# Patient Record
Sex: MALE | Race: WHITE
[De-identification: names, ages, dates, MRNs, and addresses within clinical notes are randomized per-mention and may not be internally consistent; named-entity substitution may affect disease eponyms.]

---

## 2022-11-13 ENCOUNTER — HOSPITAL ENCOUNTER (EMERGENCY)
Dept: HOSPITAL 97 - ER | Age: 22
Discharge: HOME | End: 2022-11-13
Payer: OTHER GOVERNMENT

## 2022-11-13 VITALS — OXYGEN SATURATION: 99 % | DIASTOLIC BLOOD PRESSURE: 85 MMHG | SYSTOLIC BLOOD PRESSURE: 129 MMHG | TEMPERATURE: 99 F

## 2022-11-13 DIAGNOSIS — Z20.822: ICD-10-CM

## 2022-11-13 DIAGNOSIS — J10.1: Primary | ICD-10-CM

## 2022-11-13 LAB — SARS-COV-2 RNA RESP QL NAA+PROBE: NEGATIVE

## 2022-11-13 PROCEDURE — 99283 EMERGENCY DEPT VISIT LOW MDM: CPT

## 2022-11-13 PROCEDURE — 0241U: CPT

## 2022-11-13 NOTE — ER
Nurse's Notes                                                                                     

 [facility]                                                                 

Name: Savage Torres                                                                               

Age: 22 yrs                                                                                       

Sex: Male                                                                                         

: 2000                                                                                   

MRN: [id number]                                                                                   

Arrival Date: 2022                                                                          

Time: 11:17                                                                                       

Account#: [id number]                                                                            

Bed 10                                                                                            

Private MD:                                                                                       

Diagnosis: Influenza due to identified novel influenza A virus                                    

                                                                                                  

Presentation:                                                                                     

                                                                                             

11:35 Chief complaint: Patient states: cough/congestion, chills, fever and body aches. Took   vg1 

      [place] at 0830. Coronavirus screen: Vaccine status: Patient reports receiving the 2nd      

      dose of the covid vaccine. Client denies travel out of the [place] in the last 14 days.        

      Ebola Screen: Patient negative for fever greater than or equal to 101.5 degrees             

      Fahrenheit, and additional compatible Ebola Virus Disease symptoms. Initial Sepsis          

      Screen: Does the patient meet any 2 criteria? No. Patient's initial sepsis screen is        

      negative. Does the patient have a suspected source of infection? No. Patient's initial      

      sepsis screen is negative. Risk Assessment: Do you want to hurt yourself or someone         

      else? Patient reports no desire to harm self or others. Onset of symptoms was 2022.                                                                                   

11:35 Method Of Arrival: Ambulatory                                                           vg1 

11:35 Acuity: PRERNA 4                                                                           vg1 

                                                                                                  

Triage Assessment:                                                                                

11:36 General: Appears in no apparent distress. uncomfortable, Behavior is calm, cooperative. vg1 

      Pain: Complains of pain in generalize body Pain currently is 5 out of 10 on a pain          

      scale. Respiratory: Reports cough that is Airway is patent Respiratory effort is even,      

      unlabored. GI: Patient currently denies diarrhea, nausea, vomiting.                         

                                                                                                  

Historical:                                                                                       

- Allergies:                                                                                      

11:36 No Known Allergies;                                                                     vg1 

- Home Meds:                                                                                      

11:36 None [Active];                                                                          vg1 

- PMHx:                                                                                           

11:36 None;                                                                                   vg1 

- PSHx:                                                                                           

11:36 None;                                                                                   vg1 

                                                                                                  

- Immunization history:: Client reports receiving the 2nd dose of the Covid vaccine.              

- Social history:: Smoking status: Reported history of juuling and/or vaping. Patient             

  uses street drugs, marijuana.                                                                   

                                                                                                  

                                                                                                  

Screenin:40 Abuse screen: Denies threats or abuse. Denies injuries from another. Nutritional        ss  

      screening: No deficits noted. Tuberculosis screening: Never had TB. Fall Risk None          

      identified.                                                                                 

                                                                                                  

Vital Signs:                                                                                      

11:35  / 85; Pulse 93; Resp 16; Temp 99.0(O); Pulse Ox 99% on R/A; Weight 90.72 kg;     vg1 

      Height 6 ft. 1 in. (185.42 cm); Pain 5/10;                                                  

11:35 Body Mass Index 26.39 (90.72 kg, 185.42 cm)                                             vg1 

                                                                                                  

ED Course:                                                                                        

11:17 Patient arrived in ED.                                                                  am2 

11:36 Eugene Juarez is PHCP.                                                                  jl9 

11:36 Joey Cleaning MD is Attending Physician.                                             jl9 

11:36 Triage completed.                                                                       vg1 

11:36 Arm band placed on.                                                                     vg1 

11:41 COVID swab sent to lab. Flu and/or RSV swab sent to lab.                                vg1 

13:40 Magalys Lion, RN is Primary Nurse.                                                    ss  

13:40 Patient has correct armband on for positive identification. Bed in low position.        ss  

13:40 No provider procedures requiring assistance completed. Patient did not have IV access   ss  

      during this emergency room visit.                                                           

                                                                                                  

Administered Medications:                                                                         

No medications were administered                                                                  

                                                                                                  

                                                                                                  

Outcome:                                                                                          

13:26 Discharge ordered by MD.                                                                9 

13:40 Discharged to home ambulatory.                                                          ss  

13:40 Condition: good                                                                             

13:40 Discharge instructions given to patient, family, Instructed on discharge instructions,      

      follow up and referral plans. medication usage, Demonstrated understanding of               

      instructions, follow-up care, medications, Prescriptions given X 2.                         

13:42 Patient left the ED.                                                                    ss  

                                                                                                  

Signatures:                                                                                       

Magalys Lion, RN                      RN   June Weaver am2                                                  

Holley Tse RN                    RN   vg1                                                  

Eugene Juarez                                jl9                                                  

                                                                                                  

**************************************************************************************************

## 2022-11-13 NOTE — EDPHYS
Physician Documentation                                                                           

 [facility]                                                                 

Name: Savage Torres                                                                               

Age: 22 yrs                                                                                       

Sex: Male                                                                                         

: 2000                                                                                   

MRN: [id number]                                                                                   

Arrival Date: 2022                                                                          

Time: 11:17                                                                                       

Account#: [id number]                                                                            

Bed 10                                                                                            

Private MD:                                                                                       

ED Physician Joey Cleaning                                                                      

HPI:                                                                                              

                                                                                             

13:24 This 22 yrs old  Male presents to ER via Ambulatory with complaints of Fever,  jl9 

      Runny Nose.                                                                                 

13:24 The patient reports fever, that was measured at 102 degrees Fahrenheit. Onset: The      jl9 

      symptoms/episode began/occurred yesterday. Associated signs and symptoms: Pertinent         

      positives: cough, runny nose.                                                               

                                                                                                  

Historical:                                                                                       

- Allergies:                                                                                      

11:36 No Known Allergies;                                                                     vg1 

- Home Meds:                                                                                      

11:36 None [Active];                                                                          vg1 

- PMHx:                                                                                           

11:36 None;                                                                                   vg1 

- PSHx:                                                                                           

11:36 None;                                                                                   vg1 

                                                                                                  

- Immunization history:: Client reports receiving the 2nd dose of the Covid vaccine.              

- Social history:: Smoking status: Reported history of juuling and/or vaping. Patient             

  uses street drugs, marijuana.                                                                   

                                                                                                  

                                                                                                  

ROS:                                                                                              

13:25 Eyes: Negative for injury, pain, redness, and discharge, ENT: Negative for injury,      jl9 

      pain, and discharge, Neck: Negative for injury, pain, and swelling, Cardiovascular:         

      Negative for chest pain, palpitations, and edema.                                           

13:25 Abdomen/GI: Negative for abdominal pain, nausea, vomiting, diarrhea, and constipation,      

      Back: Negative for injury and pain, : Negative for injury, bleeding, discharge, and       

      swelling, MS/Extremity: Negative for injury and deformity, Skin: Negative for injury,       

      rash, and discoloration, Neuro: Negative for headache, weakness, numbness, tingling,        

      and seizure, Psych: Negative for depression, anxiety, suicide ideation, homicidal           

      ideation, and hallucinations, Allergy/Immunology: Negative for hives, rash, and             

      allergies, Endocrine: Negative for neck swelling, polydipsia, polyuria, polyphagia, and     

      marked weight changes, Hematologic/Lymphatic: Negative for swollen nodes, abnormal          

      bleeding, and unusual bruising.                                                             

13:25 Constitutional: Positive for fever.                                                         

13:25 Respiratory: Positive for cough.                                                            

                                                                                                  

Exam:                                                                                             

13:25 Constitutional:  This is a well developed, well nourished patient who is awake, alert,  jl9 

      and in no acute distress. Head/Face:  Normocephalic, atraumatic. Eyes:  Pupils equal        

      round and reactive to light, extra-ocular motions intact.  Lids and lashes normal.          

      Conjunctiva and sclera are non-icteric and not injected.  Cornea within normal limits.      

      Periorbital areas with no swelling, redness, or edema. ENT:   Mucous membranes moist.       

      Neck:  Trachea midline, no thyromegaly or masses palpated, and no cervical                  

      lymphadenopathy.  Supple, full range of motion without nuchal rigidity, or vertebral        

      point tenderness.  No Meningismus. Chest/axilla:  Normal chest wall appearance and          

      motion.  Nontender with no deformity.  No lesions are appreciated. Cardiovascular:          

      Regular rate and rhythm with a normal S1 and S2.  No gallops, murmurs, or rubs.  Normal     

      PMI, no JVD.  No pulse deficits. Respiratory:  Lungs have equal breath sounds               

      bilaterally, clear to auscultation and percussion.  No rales, rhonchi or wheezes noted.     

       No increased work of breathing, no retractions or nasal flaring. Abdomen/GI:  Soft,        

      non-tender, with normal bowel sounds.  No distension or tympany.  No guarding or            

      rebound.  No evidence of tenderness throughout. Back:  No spinal tenderness.  No            

      costovertebral tenderness.  Full range of motion. Skin:  Warm, dry with normal turgor.      

      Normal color with no rashes, no lesions, and no evidence of cellulitis. MS/ Extremity:      

      Pulses equal, no cyanosis.  Neurovascular intact.  Full, normal range of motion. Neuro:     

       Awake and alert, GCS 15, oriented to person, place, time, and situation.  Cranial          

      nerves II-XII grossly intact.  Motor strength 5/5 in all extremities.  Sensory grossly      

      intact.  Cerebellar exam normal.  Normal gait. Psych:  Awake, alert, with orientation       

      to person, place and time.  Behavior, mood, and affect are within normal limits.            

                                                                                                  

Vital Signs:                                                                                      

11:35  / 85; Pulse 93; Resp 16; Temp 99.0(O); Pulse Ox 99% on R/A; Weight 90.72 kg;     vg1 

      Height 6 ft. 1 in. (185.42 cm); Pain 5/10;                                                  

11:35 Body Mass Index 26.39 (90.72 kg, 185.42 cm)                                             vg1 

                                                                                                  

MDM:                                                                                              

11:42 Patient medically screened.                                                             [facility] 

13:25 Differential diagnosis: viral Infection, bacterial infection, URI. Data reviewed: vital jl9 

      signs, nurses notes. Counseling: I had a detailed discussion with the patient and/or        

      guardian regarding: the historical points, exam findings, and any diagnostic results        

      supporting the discharge/admit diagnosis, lab results, the need for outpatient follow       

      up, to return to the emergency department if symptoms worsen or persist or if there are     

      any questions or concerns that arise at home.                                               

                                                                                                  

                                                                                             

11:36 Order name: COVID-19/FLU A+B/RSV (Document "Date of Onset" if Symptomatic); Complete    jl9 

      Time: 13:20                                                                                 

                                                                                                  

Administered Medications:                                                                         

No medications were administered                                                                  

                                                                                                  

                                                                                                  

Disposition Summary:                                                                              

22 13:26                                                                                    

Discharge Ordered                                                                                 

      Location: Home                                                                          jl9 

      Condition: Stable                                                                       jl9 

      Diagnosis                                                                                   

        - Influenza due to identified novel influenza A virus                                 jl9 

      Followup:                                                                               jl9 

        - With: Private Physician                                                                  

        - When: 1 - 2 days                                                                         

        - Reason: Recheck today's complaints, Continuance of care, Re-evaluation by your           

      physician                                                                                   

      Discharge Instructions:                                                                     

        - Discharge Summary Sheet                                                             jl9 

        - Influenza, Adult, Easy-to-Read                                                      jl9 

      Forms:                                                                                      

        - Work release form                                                                   ss  

        - School release form                                                                 ss  

        - Medication Reconciliation Form                                                      jl9 

        - Thank You Letter                                                                    jl9 

        - Antibiotic Education                                                                jl9 

        - Prescription Opioid Use                                                             jl9 

      Prescriptions:                                                                              

        - Tamiflu 75 mg Oral capsule                                                               

            - take 1 capsule by ORAL route 2 times per day; 10 capsule; Refills: 0, Product   jl9 

      Selection Permitted                                                                         

        - brompheniramine-pseudoeph-DM 2-30-10 mg/5 mL Oral syrup                                  

            - take 10 milliliter by ORAL route every 4 hours As needed; 100 milliliter;       jl9 

      Refills: 0, Product Selection Permitted                                                     

Addendum:                                                                                         

2022                                                                                        

     09:42 Co-signature as Attending Physician, Joey Cleaning MD I agree with the assessment and  c
ha

           plan of care.                                                                          

                                                                                                  

Signatures:                                                                                       

Dispatcher [employer]                           Joey Polanco MD MD cha Garcia, Victoria, RN                    RN   vg1                                                  

Eugene Juarez                                jl9                                                  

                                                                                                  

**************************************************************************************************